# Patient Record
Sex: MALE | Race: WHITE | NOT HISPANIC OR LATINO | Employment: UNEMPLOYED | URBAN - METROPOLITAN AREA
[De-identification: names, ages, dates, MRNs, and addresses within clinical notes are randomized per-mention and may not be internally consistent; named-entity substitution may affect disease eponyms.]

---

## 2024-04-12 ENCOUNTER — HOSPITAL ENCOUNTER (EMERGENCY)
Facility: HOSPITAL | Age: 29
Discharge: HOME | End: 2024-04-12
Payer: COMMERCIAL

## 2024-04-12 ENCOUNTER — HOSPITAL ENCOUNTER (EMERGENCY)
Facility: HOSPITAL | Age: 29
Discharge: HOME | End: 2024-04-12
Attending: EMERGENCY MEDICINE | Admitting: EMERGENCY MEDICINE
Payer: COMMERCIAL

## 2024-04-12 ENCOUNTER — PATIENT OUTREACH (OUTPATIENT)
Dept: OTHER | Facility: OTHER | Age: 29
End: 2024-04-12

## 2024-04-12 VITALS
OXYGEN SATURATION: 96 % | HEART RATE: 99 BPM | DIASTOLIC BLOOD PRESSURE: 96 MMHG | WEIGHT: 184.53 LBS | RESPIRATION RATE: 18 BRPM | TEMPERATURE: 99.2 F | SYSTOLIC BLOOD PRESSURE: 111 MMHG

## 2024-04-12 VITALS
RESPIRATION RATE: 16 BRPM | HEART RATE: 76 BPM | TEMPERATURE: 99 F | OXYGEN SATURATION: 95 % | WEIGHT: 180.56 LBS | DIASTOLIC BLOOD PRESSURE: 99 MMHG | SYSTOLIC BLOOD PRESSURE: 154 MMHG

## 2024-04-12 DIAGNOSIS — Z76.0 ENCOUNTER FOR MEDICATION REFILL: Primary | ICD-10-CM

## 2024-04-12 DIAGNOSIS — Z59.00 HOMELESSNESS: Primary | ICD-10-CM

## 2024-04-12 DIAGNOSIS — Z86.59 HISTORY OF DEPRESSION: ICD-10-CM

## 2024-04-12 DIAGNOSIS — Z86.59 HISTORY OF ADHD: ICD-10-CM

## 2024-04-12 PROCEDURE — 99284 EMERGENCY DEPT VISIT MOD MDM: CPT | Performed by: EMERGENCY MEDICINE

## 2024-04-12 PROCEDURE — 99283 EMERGENCY DEPT VISIT LOW MDM: CPT

## 2024-04-12 RX ORDER — BUPROPION HYDROCHLORIDE 150 MG/1
150 TABLET ORAL DAILY
Qty: 30 TABLET | Refills: 0 | Status: SHIPPED | OUTPATIENT
Start: 2024-04-12

## 2024-04-12 RX ORDER — BUPROPION HYDROCHLORIDE 150 MG/1
150 TABLET ORAL DAILY
Qty: 30 TABLET | Refills: 0 | Status: SHIPPED | OUTPATIENT
Start: 2024-04-12 | End: 2024-04-12 | Stop reason: RX

## 2024-04-12 SDOH — ECONOMIC STABILITY - HOUSING INSECURITY: HOMELESSNESS UNSPECIFIED: Z59.00

## 2024-04-12 NOTE — ED NOTES
Information given on [facility] outpt [facility] office.  He is also going to go to the rescue mission

## 2024-04-12 NOTE — ED NOTES
Patient presented to the ED asking for his psychiatric prescriptions. He is homeless, has been going from state to state. He is a poor historian, unable to say what his mental health hx is . He reports that he was on bupropion and remeron and is in need of his prescriptions. He reports that he hears some voices of his guardian angels telling him where to go. He is not actively suicidal, is not distressed over hearing the voices, however, he would like outpt followup and his medications. He is cooperative, vague about his history. He was hospitalized somewhere in the past. he dosent remember where. He is willing to follow with outpt resources     Patient recently moved to [place], has no services locally.  Gave him information for [facility] outpt resources    Arielle BURNS

## 2024-04-12 NOTE — DISCHARGE INSTRUCTIONS
Follow up with PCP, the outpatient  resources given to you by the crisis worker. Return to ED for new or worsening symptoms as discussed.

## 2024-04-12 NOTE — ED PROVIDER NOTES
"History  Chief Complaint   Patient presents with    Depression     Pt reports he has been depressed for the past month, has been taking bupropion and Remeron as prescribed, but they are not helping. Denies SI/HI/VH. Reports he hears voices \"I wanna say it's my guardian martha...mostly good things.\"     28-year-old male past medical history of depression, ADHD, PTSD presenting to emergency department requesting medication refill.  On clarification of medication compliance he states he ran out of his medications a few weeks ago and feels like he needs them so that his depression and PTSD do not return, because he feels like they are starting to.  To me and RN Maria Isabel he denied SI HI and command hallucinations. He is requesting Wellbutrin and Remeron. Denies acute medical complaints. States he tried to get an appointment across the street but that they couldn't see him for 2 weeks and he did not want to wait that long.     He does not remember the name of who had been prescribing his medications, he thinks he last picked them up at a [facility] in [place], [place]. He does not have the bottles or any electronic confirmation of diagnoses or prescriptions.       History provided by:  Patient  Depression  Presenting symptoms: no suicidal thoughts    Associated symptoms: no chest pain        None       Past Medical History:   Diagnosis Date    ADHD     Depression     PTSD (post-traumatic stress disorder)        History reviewed. No pertinent surgical history.    History reviewed. No pertinent family history.  I have reviewed and agree with the history as documented.    E-Cigarette/Vaping    E-Cigarette Use Current Every Day User      E-Cigarette/Vaping Substances    Nicotine Yes      Social History     Tobacco Use    Smoking status: Never    Smokeless tobacco: Never   Vaping Use    Vaping status: Every Day    Substances: Nicotine   Substance Use Topics    Alcohol use: Never    Drug use: Never       Review of Systems "   Constitutional:  Negative for fever.   Eyes:  Negative for visual disturbance.   Respiratory:  Negative for shortness of breath.    Cardiovascular:  Negative for chest pain.   Neurological:  Negative for dizziness, seizures and syncope.   Psychiatric/Behavioral:  Positive for depression. Negative for suicidal ideas.    All other systems reviewed and are negative.      Physical Exam  Physical Exam  Vitals and nursing note reviewed.   Constitutional:       General: He is awake. He is not in acute distress.     Appearance: Normal appearance. He is well-developed and well-groomed. He is not ill-appearing, toxic-appearing or diaphoretic.   HENT:      Head: Normocephalic and atraumatic.      Mouth/Throat:      Lips: Pink.      Mouth: Mucous membranes are moist.   Eyes:      General: Vision grossly intact.   Cardiovascular:      Rate and Rhythm: Normal rate and regular rhythm.      Heart sounds: Normal heart sounds.   Pulmonary:      Effort: Pulmonary effort is normal.   Abdominal:      General: There is no distension.   Skin:     General: Skin is warm and dry.      Capillary Refill: Capillary refill takes less than 2 seconds.   Neurological:      Mental Status: He is alert and oriented to person, place, and time.      Gait: Gait normal.   Psychiatric:         Attention and Perception: Attention normal.         Mood and Affect: Mood is depressed. Affect is not tearful.         Behavior: Behavior is cooperative.         Thought Content: Thought content does not include homicidal or suicidal ideation. Thought content does not include homicidal or suicidal plan.         Vital Signs  ED Triage Vitals [04/12/24 1328]   Temperature Pulse Respirations Blood Pressure SpO2   99.2 °F (37.3 °C) 99 18 111/96 96 %      Temp Source Heart Rate Source Patient Position - Orthostatic VS BP Location FiO2 (%)   Oral Monitor Sitting Left arm --      Pain Score       --           Vitals:    04/12/24 1328   BP: 111/96   Pulse: 99   Patient  Position - Orthostatic VS: Sitting         Visual Acuity      ED Medications  Medications - No data to display    Diagnostic Studies  Results Reviewed       None                   No orders to display              Procedures  Procedures         ED Course  ED Course as of 04/12/24 1901   Fri Apr 12, 2024   1354 Denies SI/HI. Denies command hallucinations.    1355 Attempted to call [facility] to confirm medications. They are currently on a break.    1405 Spoke with Pharmacist at [place], [place] who confirmed that he picked up prescription for Wellbutrin xl 150 qd for 90 days in December 2023. Fan Renee as prescriber.   August of 2023 Remeron 30 mg qd has not picked up at a [facility] since then.    1434 Seen by crisis worker, no indications for 302- taking care of self, no SI, no HI, acting appropriately. Outpatient resources given.                                SBIRT 20yo+      Flowsheet Row Most Recent Value   Initial Alcohol Screen: US AUDIT-C     1. How often do you have a drink containing alcohol? 0 Filed at: 04/12/2024 1328   2. How many drinks containing alcohol do you have on a typical day you are drinking?  0 Filed at: 04/12/2024 1328   3a. Male UNDER 65: How often do you have five or more drinks on one occasion? 0 Filed at: 04/12/2024 1328   3b. FEMALE Any Age, or MALE 65+: How often do you have 4 or more drinks on one occassion? 0 Filed at: 04/12/2024 1328   Audit-C Score 0 Filed at: 04/12/2024 1328   KIKA: How many times in the past year have you...    Used an illegal drug or used a prescription medication for non-medical reasons? Never Filed at: 04/12/2024 1328                      Medical Decision Making  No SI/HI. Answering questions appropriately. Well-kempt. No command hallucinations. No grounds for 302. Seen by Crisis, outpatient BH resources given.     Called [facility] in [place], [place] on [address] to confirm medications. Patient was not actively getting Remeron, will not refill. 3 month supply of  "Wellbutrin XL was prescribed in December, aligns with when patient ran out of medication. Dosage confirmed with pharmacy. Refill sent.     All imaging and/or lab testing discussed with patient, strict return to ED precautions discussed. Patient recommended to follow up promptly with appropriate outpatient provider. Patient and/or family members verbalizes understanding and agrees with plan. Patient and/or family members were given opportunity to ask questions, all questions were answered at this time. Patient is stable for discharge.     Portions of the record may have been created with voice recognition software. Occasional wrong word or \"sound a like\" substitutions may have occurred due to the inherent limitations of voice recognition software. Read the chart carefully and recognize, using context, where substitutions have occurred.       Risk  Prescription drug management.             Disposition  Final diagnoses:   Encounter for medication refill   History of depression - self reported    History of ADHD - self reported      Time reflects when diagnosis was documented in both MDM as applicable and the Disposition within this note       Time User Action Codes Description Comment    4/12/2024  2:31 PM Annita Norwood [Z76.0] Encounter for medication refill     4/12/2024  2:31 PM Annita Norwood Add [Z86.59] History of depression     4/12/2024  2:31 PM Annita Norwood Add [Z86.59] History of ADHD     4/12/2024  2:31 PM Annita Norwood Modify [Z86.59] History of depression self reported     4/12/2024  2:31 PM Annita Norwood Modify [Z86.59] History of ADHD self reported           ED Disposition       ED Disposition   Discharge    Condition   Stable    Date/Time   Fri Apr 12, 2024 1433    Comment   Armin Quigley discharge to home/self care.                   Follow-up Information       Follow up With Specialties Details Why Contact Info Additional Information    [facility] " Medicine Schedule an appointment as soon as possible for a visit  For follow up regarding your symptoms [address] [place] [address], [address] [address] [phone] [facility], [address] [facility], [place] [address], [place], [place], [address] [phone]            Discharge Medication List as of 4/12/2024  2:40 PM        START taking these medications    Details   buPROPion (Wellbutrin XL) 150 mg 24 hr tablet Take 1 tablet (150 mg total) by mouth daily, Starting Fri 4/12/2024, Normal                 PDMP Review       None            ED Provider  Electronically Signed by             Annita Norwood PA-C  04/12/24 5647

## 2024-04-12 NOTE — PROGRESS NOTES
04/12/2024    Armin Quigley came to talk with this CHW. Stated he just left [facility] ED and did not have enough money to cover his medications. Stated that the ED told him to come to [facility] to have medications paid for. This CHW was with Carri HARE, who contacted [facility] and had his medication covered. No further outreach needed.

## 2024-04-13 NOTE — DISCHARGE INSTRUCTIONS
Return to the ED for any worsening symptoms or new symptoms. Follow up with your primary care doctor as soon as possible.  Follow up with the resources given to you.

## 2024-04-13 NOTE — ED PROVIDER NOTES
"History  Chief Complaint   Patient presents with    Personal Problem     Pt reports he is looking for a place to stay. \"The shelter just doesn't seem like a safe place to stay.\"     27 y/o male patient presenting for homelessness. Patient denies any other symptoms. Patient states he is just looking for a place to stay. No SI, or HI.         Prior to Admission Medications   Prescriptions Last Dose Informant Patient Reported? Taking?   buPROPion (Wellbutrin XL) 150 mg 24 hr tablet   No No   Sig: Take 1 tablet (150 mg total) by mouth daily      Facility-Administered Medications: None       Past Medical History:   Diagnosis Date    ADHD     Depression     PTSD (post-traumatic stress disorder)        History reviewed. No pertinent surgical history.    History reviewed. No pertinent family history.  I have reviewed and agree with the history as documented.    E-Cigarette/Vaping    E-Cigarette Use Current Every Day User      E-Cigarette/Vaping Substances    Nicotine Yes      Social History     Tobacco Use    Smoking status: Never    Smokeless tobacco: Never   Vaping Use    Vaping status: Every Day    Substances: Nicotine   Substance Use Topics    Alcohol use: Never    Drug use: Never       Review of Systems   All other systems reviewed and are negative.      Physical Exam  Physical Exam  Vitals reviewed.   Constitutional:       Appearance: Normal appearance.   HENT:      Head: Normocephalic and atraumatic.      Nose: Nose normal.      Mouth/Throat:      Mouth: Mucous membranes are moist.      Pharynx: Oropharynx is clear.   Eyes:      Extraocular Movements: Extraocular movements intact.      Conjunctiva/sclera: Conjunctivae normal.   Cardiovascular:      Rate and Rhythm: Normal rate and regular rhythm.      Pulses: Normal pulses.      Heart sounds: Normal heart sounds.   Pulmonary:      Effort: Pulmonary effort is normal.      Breath sounds: Normal breath sounds.   Musculoskeletal:         General: Normal range of motion. "      Cervical back: Normal range of motion.   Skin:     General: Skin is warm and dry.   Neurological:      General: No focal deficit present.      Mental Status: He is alert and oriented to person, place, and time. Mental status is at baseline.         Vital Signs  ED Triage Vitals [04/12/24 1955]   Temperature Pulse Respirations Blood Pressure SpO2   99 °F (37.2 °C) 76 16 154/99 95 %      Temp Source Heart Rate Source Patient Position - Orthostatic VS BP Location FiO2 (%)   Oral Monitor Sitting Right arm --      Pain Score       --           Vitals:    04/12/24 1955   BP: 154/99   Pulse: 76   Patient Position - Orthostatic VS: Sitting         Visual Acuity      ED Medications  Medications - No data to display    Diagnostic Studies  Results Reviewed       None                   No orders to display              Procedures  Procedures         ED Course                               SBIRT 22yo+      Flowsheet Row Most Recent Value   Initial Alcohol Screen: US AUDIT-C     1. How often do you have a drink containing alcohol? 0 Filed at: 04/12/2024 1956   2. How many drinks containing alcohol do you have on a typical day you are drinking?  0 Filed at: 04/12/2024 1956   3a. Male UNDER 65: How often do you have five or more drinks on one occasion? 0 Filed at: 04/12/2024 1956   3b. FEMALE Any Age, or MALE 65+: How often do you have 4 or more drinks on one occassion? 0 Filed at: 04/12/2024 1956   Audit-C Score 0 Filed at: 04/12/2024 1956   KIKA: How many times in the past year have you...    Used an illegal drug or used a prescription medication for non-medical reasons? Never Filed at: 04/12/2024 1956                      Medical Decision Making  28-year-old male patient presenting with homelessness.  Patient looking for a place to stay.  Has no other symptoms or complaints.  Exam within normal limits.  Offered patient resources for shelters.  Stable for discharge with follow-up with PCP peer return precautions  given.             Disposition  Final diagnoses:   Homelessness     Time reflects when diagnosis was documented in both MDM as applicable and the Disposition within this note       Time User Action Codes Description Comment    4/13/2024  3:05 PM Sharad Mandujano Add [Z59.00] Homelessness           ED Disposition       ED Disposition   Discharge    Condition   Stable    Date/Time   Fri Apr 12, 2024 2011    Comment   Armin Quigley discharge to home/self care.                   Follow-up Information    None         Discharge Medication List as of 4/12/2024  8:12 PM        CONTINUE these medications which have NOT CHANGED    Details   buPROPion (Wellbutrin XL) 150 mg 24 hr tablet Take 1 tablet (150 mg total) by mouth daily, Starting Fri 4/12/2024, Normal             No discharge procedures on file.    PDMP Review       None            ED Provider  Electronically Signed by             Sharad Mandujano MD  04/13/24 7602